# Patient Record
(demographics unavailable — no encounter records)

---

## 2018-01-02 NOTE — POSTOPPROG
Post Op Note


Date of Operation: 01/02/18


Surgeon: Rupert Gilbert


Assistant: kimo Nieto


Anesthesiologist: Dr Thao


Anesthesia: GET(General Endotracheal)


Pre-op Diagnosis: severe refulx, hiatal hernia


Post-op Diagnosis: same


Indication: severe reflux


Procedure: lap nissen


Findings: small hiatal hernia


Inf/Abcess present in the surg proc area at time of surgery?: No


Depth: Organ Space


EBL: Minimal

## 2018-01-02 NOTE — PDHPUP
History & Physical Update


H&P update statement: 


This history and physical update is based on an assessment of the patient which 

was completed after admission or registration (within 24 hours), but prior to 

the surgery/procedure.


updated

## 2018-01-02 NOTE — PDANEPAE
ANE History of Present Illness





severe GERD s/f laproscopic Nissen fundoplication





ANE Past Medical History





- Cardiovascular History


Hx Hypertension: No


Hx Arrhythmias: No


Hx Chest Pain: No


Hx Coronary Artery / Peripheral Vascular Disease: No


Hx CHF / Valvular Disease: No


Hx Palpitations: No





- Pulmonary History


Hx COPD: No


Hx Asthma/Reactive Airway Disease: No


Hx Recent Upper Respiratory Infection: No


Hx Oxygen in Use at Home: No


Hx Sleep Apnea: No


Sleep Apnea Screening Result - Last Documented: Negative





- Neurologic History


Hx Cerebrovascular Accident: No


Hx Seizures: No


Hx Dementia: No





- Endocrine History


Hx Diabetes: No


Hyperthyroid: Yes





- Renal History


Hx Renal Disorders: No





- Liver History


Hx Hepatic Disorders: No





- Neurological & Psychiatric Hx


Hx Neurological and Psychiatric Disorders: No





- Cancer History


Hx Cancer: No





- Congenital Disorder History


Hx Congenital Disorders: No





- GI History


Hx Gastrointestinal Disorders: Yes


Gastrointestinal History Comment: hiatel hernia





- Other Health History


Other Health History: none





- Chronic Pain History


Chronic Pain: No





- Surgical History


Prior Surgeries: none





ANE Review of Systems


Review of Systems: 








- Exercise capacity


METS (RN): 4 METS





ANE Patient History





- Allergies


Allergies/Adverse Reactions: 








No Known Allergies Allergy (Verified 01/02/18 06:05)


 








- Home Medications


Home Medications: 








Calcium  12/29/17 [Last Taken 01/01/18 17:00]


Claritin  12/29/17 [Last Taken 01/01/18 17:00]


Omeprazole  12/29/17 [Last Taken 01/01/18 16:30]


Tirosint  12/29/17 [Last Taken 01/02/18 01:30]


Vit C-Mai Hips 500 mg Chew Tb  12/29/17 [Last Taken 01/01/18 09:00]


Zantac  12/29/17 [Last Taken 01/01/18 10:30]








- NPO status


NPO Since - Liquids (Date): 01/01/18


NPO Since - Liquids (Time): 21:00


NPO Since - Solids (Date): 01/01/18


NPO Since - Solids (Time): 21:00





- Anes Hx


Anes Hx: post operative nausea (after GI case with Fentanyl and Versed)





- Smoking Hx


Smoking Status: Never smoked





- Family Anes Hx


Family Hx Anesthesia Complications: none





ANE Labs/Vital Signs





- Vital Signs


Blood Pressure: 108/55


Heart Rate: 68


Respiratory Rate: 16


O2 Sat (%): 97


Height: 167.64 cm


Weight: 58.06 kg





ANE Physical Exam





- Airway


Mallampati Score: Class 2


Mouth exam: small mouth opening (short mandible)





- Pulmonary


Pulmonary: no respiratory distress





- Cardiovascular


Cardiovascular: regular rate and rhythym





- ASA Status


ASA Status: II





ANE Anesthesia Plan


Anesthesia Plan: general endotracheal anesthesia

## 2018-01-02 NOTE — GOP
[f rep st]



                                                                OPERATIVE REPORT





DATE OF OPERATION:  01/02/2018



SURGEON:  Rupert Gilbert MD



ASSISTANT:  DONNIE Bennett



ANESTHESIOLOGIST:  Travis Thao MD



PREOPERATIVE DIAGNOSIS:  Significant gastroesophageal reflux disease.



POSTOPERATIVE DIAGNOSIS:  Significant gastroesophageal reflux disease.



PROCEDURE PERFORMED:  Laparoscopic Nissen fundoplication.



FINDINGS:  The patient was found to have minimal, if any, hiatal hernia.  She appeared to have wide o
pen reflux, but no other abnormalities were seen.





ESTIMATED BLOOD LOSS:  Less than 10 cc.



DESCRIPTION OF PROCEDURE:  The patient was taken to the operating room where she received satisfactor
y general endotracheal anesthesia by Dr. Thao.  She was placed in the supine position in split leg s
tirrups.  She was then prepped and draped in the usual sterile fashion.  A mid epigastric incision wa
s made, a Veress needle was inserted, and pneumoperitoneum was established.  A trocar was introduced 
with good visualization obtained and 4 other trocars were placed in the upper abdomen under direct vi
lily.  The left lateral segment of the liver was elevated up with a liver retractor, which was attach
ed to the wall of the port sites.  The hiatus was then exposed with dissection using a Harmonic scalp
el, which also exposed both anterior and posterior johnnie.  The hiatal hernia sac was opened circumfere
ntially and the stomach was reduced back down into the abdomen.  The esophagus was mobilized from, at
 least, 8 cm into the chest so that it could easily fit back down to the abdomen.  Diaphragmatic johnnie
 was then approximated with interrupted 0 Ethibond sutures.  This was done with a 52 bougie dilator a
nd created a snug closure.  A posterior window behind the esophagus had been freed up and short gastr
ics were divided with the Harmonic scalpel.  The fundus of the stomach was passed around through this
 retroesophageal tunnel to create a Nissen wrap.  This was done with interrupted 0 Ethibond sutures, 
suturing the anterior wall of the stomach to the anterior wall of the esophagus and then to the fundo
plication wrap.  Three sutures were used for the wrap, creating a loose floppy wrap, over a 52 bougie
 dilator.  Hemostasis was assured.  The bougie was removed along with the orogastric tube.  Trocars w
ere removed under direct vision and trocar sites were closed with 4-0 Monocryl subcuticular stitches 
for the skin and all layers were infiltrated with 0.5% Marcaine.  She tolerated the procedure well.



COMPLICATIONS:  None.



DISPOSITION:  She was taken to the recovery room in good condition.



Copy requested to:

[facility]



Job #:  [id number]

## 2018-01-02 NOTE — ASMTCASEMG
Living Arrangements

 

What is your living           Answers:  With Spouse                           

arrangement? Who do you                                                       

live with?                                                                    

Type Of Residence

 

What kind of residence do     Answers:  House                                 

you live in?                                                                  

Discharge Plan Comments

 

Coordination Status           

Comments                      

Notes:

Patient is a 46yo  female who was admitted for laparoscopic hiatle hernia repair. No 

therapies have been ordered. D/C needs TBD. CM will follow.

 

Date Signed:  01/02/2018 10:24 AM

Electronically Signed By:Starla Greer LCSW

## 2018-01-03 NOTE — SOAPPROG
SOAP Progress Note


Assessment/Plan: 


Assessment:





Doing great 1 day status post lap Nissen/wound okay/afebrile/tolerating liquids/

mobile


Wants to go home


Chest clear, abdomen soft, afebrile

















Plan:  Advance diet probable discharge today





01/03/18 11:53





Objective: 





 Vital Signs











Temp Pulse Resp BP Pulse Ox


 


 36.6 C   68   18   111/58 L  98 


 


 01/03/18 11:05  01/03/18 11:05  01/03/18 11:05  01/03/18 11:05  01/03/18 11:05








 Laboratory Results





 01/03/18 04:51 





 01/03/18 04:51 





 











 01/02/18 01/03/18 01/04/18





 05:59 05:59 05:59


 


Intake Total  1420 2283


 


Output Total  1875 1300


 


Balance  [phone]














ICD10 Worksheet


Patient Problems: 


 Problems











Problem Status Onset


 


Hiatal hernia with GERD Acute  














- ICD10 Problem Qualifiers


(1) Hiatal hernia with GERD